# Patient Record
Sex: MALE | Race: OTHER | Employment: UNEMPLOYED | URBAN - NONMETROPOLITAN AREA
[De-identification: names, ages, dates, MRNs, and addresses within clinical notes are randomized per-mention and may not be internally consistent; named-entity substitution may affect disease eponyms.]

---

## 2022-05-03 ENCOUNTER — OFFICE VISIT (OUTPATIENT)
Dept: FAMILY MEDICINE CLINIC | Age: 27
End: 2022-05-03

## 2022-05-03 VITALS
DIASTOLIC BLOOD PRESSURE: 82 MMHG | BODY MASS INDEX: 32.8 KG/M2 | WEIGHT: 216.4 LBS | HEART RATE: 74 BPM | RESPIRATION RATE: 20 BRPM | HEIGHT: 68 IN | OXYGEN SATURATION: 99 % | TEMPERATURE: 97.7 F | SYSTOLIC BLOOD PRESSURE: 118 MMHG

## 2022-05-03 DIAGNOSIS — M10.9 ACUTE GOUT OF LEFT FOOT, UNSPECIFIED CAUSE: Primary | ICD-10-CM

## 2022-05-03 PROCEDURE — 99202 OFFICE O/P NEW SF 15 MIN: CPT

## 2022-05-03 PROCEDURE — 99203 OFFICE O/P NEW LOW 30 MIN: CPT | Performed by: NURSE PRACTITIONER

## 2022-05-03 RX ORDER — PREDNISONE 20 MG/1
20 TABLET ORAL 2 TIMES DAILY
Qty: 10 TABLET | Refills: 0 | Status: SHIPPED | OUTPATIENT
Start: 2022-05-03 | End: 2022-05-08

## 2022-05-03 SDOH — ECONOMIC STABILITY: FOOD INSECURITY: WITHIN THE PAST 12 MONTHS, THE FOOD YOU BOUGHT JUST DIDN'T LAST AND YOU DIDN'T HAVE MONEY TO GET MORE.: NEVER TRUE

## 2022-05-03 SDOH — ECONOMIC STABILITY: FOOD INSECURITY: WITHIN THE PAST 12 MONTHS, YOU WORRIED THAT YOUR FOOD WOULD RUN OUT BEFORE YOU GOT MONEY TO BUY MORE.: NEVER TRUE

## 2022-05-03 ASSESSMENT — SOCIAL DETERMINANTS OF HEALTH (SDOH): HOW HARD IS IT FOR YOU TO PAY FOR THE VERY BASICS LIKE FOOD, HOUSING, MEDICAL CARE, AND HEATING?: NOT HARD AT ALL

## 2022-05-03 ASSESSMENT — ENCOUNTER SYMPTOMS: COLOR CHANGE: 1

## 2022-05-03 NOTE — PATIENT INSTRUCTIONS
Prednisona jasson se indica en el desayuno y la oswaldo. Ekaterina vez que se acaba la prednisona, puede ibuprofeno 800 mg cada 8 horas según sea necesario para el dolor. Cerezas para comer - 14 al día. Si no mejora, regrese para seguimiento. Patient Education        Gota: Instrucciones de cuidado  Gout: Care Instructions  Generalidades     La gota es ekaterina forma de artritis causada por la acumulación de zachariah de ácido úrico en ekaterina articulación. Causa ataques repentinos de dolor, hinchazón, enrojecimiento y rigidez, por lo general en ekaterina articulación, sobre todo en eldedo pablito del pie. La gota suele presentarse sin causa alguna. Irene puede producirse por beber alcohol (especialmente cerveza), por comer o beber cosas hechas con jarabe de maíz con alto contenido de fructosa, o por comer mariscos o naye crowley. Dustin determinados medicamentos, jasson diuréticos, también puede provocar un ataque degota. Dustin los medicamentos ana jasson se los receten y asistir con regularidad a las consultas de seguimiento con villalta médico pueden ayudarle a evitar ataques de gotaen el futuro. La atención de seguimiento es ekaterina parte clave de villalta tratamiento y seguridad. Asegúrese de hacer y acudir a todas las citas, y llame a villalta médico si está teniendo problemas. También es ekaterina buena idea saber los [place] de susexámenes y mantener ekaterina lista de los medicamentos que josh. ¿Cómo puede cuidarse en el hogar?  Si la articulación está hinchada, colóquese hielo o ekaterina compresa fría en la liu devendra 10 a 20 minutos cada vez. Póngase un paño arias entre el hielo y la piel.  Eleve la extremidad adolorida sobre ekaterina almohada cuando se aplique hielo o en cualquier momento que se siente o se acueste devendra los 3 días siguientes. Trate de mantenerla por encima del nivel del corazón. [place] puede ayudar a reducir la hinchazón.  Descanse las articulaciones adoloridas.  Evite las actividades que impliquen poner peso o 1932 Jayme Barajas articulaciones por unos días. [place] breves descansos en le actividades regulares devendra el día.  [employer] medicamentos exactamente jasson le fueron recetados. Llame a villalta médico si doris estar teniendo problemas con villalta medicamento.  [place] los analgésicos (medicamentos para el dolor) exactamente según las indicaciones. ? Si el médico le recetó un analgésico, tómelo según las indicaciones. ? Si no está tomando un analgésico recetado, pregúntele a villalta médico si puede michael hood de [employer].  Coma menos mariscos y naye crowley.  Evite alimentos o bebidas elaborados con jarabe de maíz con alto contenido de fructosa.  Pregúntele a villalta médico antes de michael alcohol.  Bajar de peso, si tiene sobrepeso, puede ayudar a reducir los ataques de [place]. Irene no siga alexus dieta que cause alexus pérdida de [place] rápida. [facility] en poco tiempo puede provocar un ataque de [place]. ¿Cuándo debe pedir ayuda? Llame a villalta médico ahora mismo o busque atención médica inmediata si:     Tiene fiebre.      La articulación le duele tanto que no puede usarla.      Tiene repentina e inexplicable hinchazón, enrojecimiento, calor o dolor intenso en alexus o más articulaciones. Preste especial atención a los cambios en villalta carolann y asegúrese de comunicarsecon villalta médico si:     Tiene dolor en alexus articulación.      Le síntomas empeoran o no mejoran después de 2 ó 3 días. ¿Dónde puede encontrar más información en inglés? Sia Fitzgerald a [email or web address] e ingrese a villalta cuenta de MyChart. Darin Hirsch T759 en el [place] December \"Search Health Information\" para más información (en inglés) sobre \"Gota: Instrucciones de cuidado. \"     Si no tiene alexus cuenta, lisa marilee en el enlace \"Sign Up Now\". Revisado: 20 diciembre, 2021               Versión del contenido: 13.2  © 9098-1856 Healthwise, [employer]. Las instrucciones de cuidado fueron adaptadas bajo licencia por [facility] ([facility] [place]).  Si usted tiene preguntas sobre alexus afección [place] o sobre estas instrucciones, siempre pregunte a villalta profesional de carolann. [facility] niega toda garantía o responsabilidad por villalta uso de esta información. Patient Education        Geraldine Nanda en purinas: Instrucciones de cuidado  Purine-Restricted Diet: Care Instructions  Instrucciones de [address] son sustancias que se encuentran en ciertos alimentos. Villalta cuerpo transforma las purinas en ácido úrico. Un nivel alto de ácido úrico puede causar gota, alexus forma de artritis que provoca dolor e inflamación en lasarticulaciones. Usted podría ayudar a controlar la cantidad de ácido úrico en villalta cuerpolimitando los alimentos con alto contenido en purinas de villalta Rupali Dull. La atención de seguimiento es alexus parte clave de villalta tratamiento y seguridad. Asegúrese de hacer y acudir a todas las citas, y llame a villalta médico si está teniendo problemas. También es alexus buena idea saber los [place] de susexámenes y mantener alexus lista de los medicamentos que josh. ¿Cómo puede cuidarse en el hogar?  Planifique radha comidas y refrigerios con alimentos que daniela bajos en purinas y seguros para usted. Estos alimentos incluyen:  ? Verduras verdes y tomates. ? Frutas. ? Sandy, arroz y cereales integrales. ? Huevos, mantequilla de cacahuate (maní) y nueces. ? Billye Moles y otros productos lácteos semidescremados. ? Palomitas de maíz (\"popcorn\"). ? Postres de gelatina, chocolate, cacao, así jasson tartas y dulces en pequeñas cantidades.  Puede comer alimentos que daniela medianamente ricos en purinas, rene solo de vez en cuando. Estos alimentos incluyen:  ? Legumbres, jasson frijoles (habichuelas) secos y arvejas (chícharos) secas. Puede comer 1 taza de legumbres cocidas al día. ? Espárragos, coliflor, espinacas, hongos y arvejas (chícharos). ? Pescados y mariscos (que no daniela mariscos muy ricos en purinas). ? German, salvado de ishmael y germen de [place].    Limite los alimentos muy ricos en purinas jasson:  ? Regla Aleida hígado, riñones, mollejas y sesos. ? Robert, incluyendo tocino, res, cerdo y ibarra. ? Robert de animales de caza y cualquier otro tipo de carne en grandes cantidades. ? Lalla Petrin, arenque, caballa y vieiras. ? Salsa de carne (\"gravy\"). ? Matt Fly. ¿Dónde puede encontrar más información en inglés? Sia Fitzgerald a [email or web address] e ingrese a villalta cuenta de MyChart. Darin Hirsch F448 en el [place] December \"Search Health Information\" para más información (en inglés) sobre \"Dieta restringida en purinas: Instrucciones de cuidado. \"     Si no tiene alexus cuenta, lisa marilee en el enlace \"Sign Up Now\". Revisado: 8 septiembre, 2021               Versión del contenido: 13.2  © 2769-2403 Healthwise, Incorporated. Las instrucciones de cuidado fueron adaptadas bajo licencia por [facility] ([facility]). Si usted tiene [place] afección médica o sobre estas instrucciones, siempre pregunte a villalta profesional de carolann. Healthwise, Incorporated niega toda garantía o responsabilidad por villalta uso de esta información.

## 2022-05-03 NOTE — PROGRESS NOTES
[address], APRN-CNP  [address]  Phone:  [phone]  Fax:  [phone]  Henry Rosario is a 27 y.o. male who presents today for his medical conditions/complaints as noted below. Henry Rosario c/o of Foot Pain (Was playing soccer on 5/1 when towards the end of the game when his left foot started to get sore. the pain continued throutout the day. pt rates 10/10.)    Kristopher Wheat [id number]   HPI:     Foot Pain   The pain is present in the left foot and left ankle. This is a new problem. The current episode started in the past 7 days (Sunday, May 1). History of extremity trauma: Played soccer on Sunday. The problem occurs constantly. The problem has been gradually worsening. The quality of the pain is described as pounding. The pain is at a severity of 10/10. Associated symptoms include an inability to bear weight and tingling. Pertinent negatives include no fever. The symptoms are aggravated by activity. He has tried acetaminophen and NSAIDS for the symptoms. Family history includes gout. His past medical history is significant for gout. Patient states he has a history of gout. Wt Readings from Last 3 Encounters:   05/03/22 216 lb 6.4 oz (98.2 kg)       Temp Readings from Last 3 Encounters:   05/03/22 97.7 °F (36.5 °C)       BP Readings from Last 3 Encounters:   05/03/22 118/82       Pulse Readings from Last 3 Encounters:   05/03/22 74        SpO2 Readings from Last 3 Encounters:   05/03/22 99%             History reviewed. No pertinent past medical history. History reviewed. No pertinent surgical history. History reviewed. No pertinent family history.   Social History     Tobacco Use    Smoking status: Never Smoker    Smokeless tobacco: Never Used   Substance Use Topics    Alcohol use: Not on file      Current Outpatient Medications   Medication Sig Dispense Refill    predniSONE (DELTASONE) 20 MG tablet Take 1 tablet by mouth 2 times daily for 5 days 10 tablet 0     No current facility-administered medications for this visit. No Known Allergies    No exam data present    Subjective:      Review of Systems   Constitutional: Negative for chills and fever. Musculoskeletal: Positive for arthralgias, gait problem, gout and myalgias. Skin: Positive for color change. Neurological: Positive for tingling. Objective:     /82 (Site: Right Upper Arm, Position: Sitting, Cuff Size: Medium Adult)   Pulse 74   Temp 97.7 °F (36.5 °C)   Resp 20   Ht 5' 8\" (1.727 m)   Wt 216 lb 6.4 oz (98.2 kg)   SpO2 99%   BMI 32.90 kg/m²     Physical Exam  Vitals reviewed. Exam conducted with a chaperone present. Constitutional:       Appearance: He is obese. Musculoskeletal:      Left foot: Decreased range of motion. Swelling and tenderness present. Legs:    Skin:     General: Skin is warm and dry. Findings: Erythema present. Neurological:      Mental Status: He is alert. Assessment:      Diagnosis Orders   1. Acute gout of left foot, unspecified cause  predniSONE (DELTASONE) 20 MG tablet     No results found for this visit on 05/03/22. Plan:       Prednisone as directed at breakfast and dinner. Once the prednisone is gone he can ibuprofen 800 mg every 8 hours as needed for pain. Cherries to eat - 14 a day. If no improvement please return for follow up. Prednisona jasson se indica en el desayuno y la oswaldo. Ekaterina vez que se acaba la prednisona, puede ibuprofeno 800 mg cada 8 horas según sea necesario para el dolor. Cerezas para comer - 14 al día. Si no mejora, regrese para seguimiento. There are no Patient Instructions on file for this visit. Patient/Caregiver instructed on use, benefit, and side effects of prescribed medications. All patient/parent/caregiver questions answered. Patient/parent/caregiver voiced understanding. Reviewed health maintenance.   Instructed to continue current medications, diet and exercise. Patient agreed with treatment plan. Follow up as directed.            Electronically signed by KAYCEE Weber NP on5/3/2022